# Patient Record
Sex: FEMALE | Race: WHITE | Employment: OTHER | URBAN - METROPOLITAN AREA
[De-identification: names, ages, dates, MRNs, and addresses within clinical notes are randomized per-mention and may not be internally consistent; named-entity substitution may affect disease eponyms.]

---

## 2022-03-24 ENCOUNTER — EMERGENCY VISIT (OUTPATIENT)
Dept: URBAN - METROPOLITAN AREA CLINIC 36 | Facility: CLINIC | Age: 67
End: 2022-03-24

## 2022-03-24 DIAGNOSIS — H04.123: ICD-10-CM

## 2022-03-24 DIAGNOSIS — H57.12: ICD-10-CM

## 2022-03-24 DIAGNOSIS — Z98.890: ICD-10-CM

## 2022-03-24 PROCEDURE — 92004 COMPRE OPH EXAM NEW PT 1/>: CPT

## 2022-03-24 ASSESSMENT — TONOMETRY
OS_IOP_MMHG: 11
OD_IOP_MMHG: 11

## 2022-03-24 ASSESSMENT — VISUAL ACUITY
OD_PH: 20/25
OS_SC: 20/30
OD_SC: 20/40-2

## 2022-03-29 ENCOUNTER — FOLLOW UP (OUTPATIENT)
Dept: URBAN - METROPOLITAN AREA CLINIC 36 | Facility: CLINIC | Age: 67
End: 2022-03-29

## 2022-03-29 DIAGNOSIS — H57.12: ICD-10-CM

## 2022-03-29 DIAGNOSIS — H04.123: ICD-10-CM

## 2022-03-29 PROCEDURE — 92012 INTRM OPH EXAM EST PATIENT: CPT

## 2022-04-06 NOTE — PATIENT DISCUSSION
Told her we would have to order trials and have her come in for an I&R and look at them on her eyes and have her try the trials before ordering.

## 2022-04-06 NOTE — PATIENT DISCUSSION
talked about cls today with pt - told her if we do cls she may still need to wear reading glasses over top of them for near vision.

## 2023-01-27 ENCOUNTER — COMPREHENSIVE EXAM (OUTPATIENT)
Dept: URBAN - METROPOLITAN AREA CLINIC 36 | Facility: CLINIC | Age: 68
End: 2023-01-27

## 2023-01-27 DIAGNOSIS — Z98.890: ICD-10-CM

## 2023-01-27 DIAGNOSIS — H52.7: ICD-10-CM

## 2023-01-27 DIAGNOSIS — H04.123: ICD-10-CM

## 2023-01-27 DIAGNOSIS — H25.813: ICD-10-CM

## 2023-01-27 DIAGNOSIS — H18.513: ICD-10-CM

## 2023-01-27 PROCEDURE — 92014 COMPRE OPH EXAM EST PT 1/>: CPT

## 2023-01-27 PROCEDURE — 92015 DETERMINE REFRACTIVE STATE: CPT

## 2023-01-27 ASSESSMENT — VISUAL ACUITY
OS_SC: 20/40-1
OD_CC: J1
OS_SC: J12
OS_CC: J1
OD_SC: J12
OD_SC: 20/30-1

## 2023-01-27 ASSESSMENT — TONOMETRY
OD_IOP_MMHG: 11
OS_IOP_MMHG: 11

## 2023-04-18 ENCOUNTER — POST-OP (OUTPATIENT)
Dept: URBAN - METROPOLITAN AREA CLINIC 36 | Facility: CLINIC | Age: 68
End: 2023-04-18

## 2023-04-18 DIAGNOSIS — Z96.1: ICD-10-CM

## 2023-04-18 PROCEDURE — 99024 POSTOP FOLLOW-UP VISIT: CPT

## 2023-04-18 ASSESSMENT — TONOMETRY
OS_IOP_MMHG: 10
OD_IOP_MMHG: 08

## 2023-04-18 ASSESSMENT — VISUAL ACUITY
OD_SC: J3
OU_SC: 20/20-2
OS_SC: 20/25-2
OS_SC: J3
OU_SC: J2
OD_SC: 20/30+2

## 2023-07-07 ENCOUNTER — FOLLOW UP (OUTPATIENT)
Dept: URBAN - METROPOLITAN AREA CLINIC 36 | Facility: CLINIC | Age: 68
End: 2023-07-07

## 2023-07-07 DIAGNOSIS — H35.721: ICD-10-CM

## 2023-07-07 DIAGNOSIS — H04.123: ICD-10-CM

## 2023-07-07 DIAGNOSIS — H53.8: ICD-10-CM

## 2023-07-07 PROCEDURE — 92012 INTRM OPH EXAM EST PATIENT: CPT

## 2023-07-07 PROCEDURE — 92134 CPTRZ OPH DX IMG PST SGM RTA: CPT

## 2023-07-07 RX ORDER — PREDNISOLONE ACETATE 10 MG/ML: 1 SUSPENSION/ DROPS OPHTHALMIC TWICE A DAY

## 2023-07-07 ASSESSMENT — TONOMETRY
OD_IOP_MMHG: 10
OS_IOP_MMHG: 11

## 2023-07-07 ASSESSMENT — VISUAL ACUITY
OS_SC: 20/20
OD_SC: 20/50-1
OD_PH: 20/30

## 2023-07-11 ENCOUNTER — FOLLOW UP (OUTPATIENT)
Dept: URBAN - METROPOLITAN AREA CLINIC 36 | Facility: CLINIC | Age: 68
End: 2023-07-11

## 2023-07-11 DIAGNOSIS — H35.721: ICD-10-CM

## 2023-07-11 DIAGNOSIS — H04.123: ICD-10-CM

## 2023-07-11 DIAGNOSIS — H53.8: ICD-10-CM

## 2023-07-11 PROCEDURE — 92134 CPTRZ OPH DX IMG PST SGM RTA: CPT

## 2023-07-11 PROCEDURE — 92012 INTRM OPH EXAM EST PATIENT: CPT

## 2023-07-11 ASSESSMENT — VISUAL ACUITY
OS_SC: 20/25
OD_SC: 20/40-1
OD_PH: 20/30-1

## 2023-07-11 ASSESSMENT — TONOMETRY
OD_IOP_MMHG: 10
OS_IOP_MMHG: 10

## 2023-10-17 ENCOUNTER — COMPREHENSIVE EXAM (OUTPATIENT)
Dept: URBAN - METROPOLITAN AREA CLINIC 36 | Facility: CLINIC | Age: 68
End: 2023-10-17

## 2023-10-17 DIAGNOSIS — H01.003: ICD-10-CM

## 2023-10-17 DIAGNOSIS — H18.593: ICD-10-CM

## 2023-10-17 DIAGNOSIS — H18.513: ICD-10-CM

## 2023-10-17 DIAGNOSIS — Z96.1: ICD-10-CM

## 2023-10-17 DIAGNOSIS — H43.813: ICD-10-CM

## 2023-10-17 DIAGNOSIS — H04.123: ICD-10-CM

## 2023-10-17 DIAGNOSIS — H52.7: ICD-10-CM

## 2023-10-17 DIAGNOSIS — H01.006: ICD-10-CM

## 2023-10-17 DIAGNOSIS — Z98.890: ICD-10-CM

## 2023-10-17 PROCEDURE — 92015 DETERMINE REFRACTIVE STATE: CPT

## 2023-10-17 PROCEDURE — 92014 COMPRE OPH EXAM EST PT 1/>: CPT

## 2023-10-17 ASSESSMENT — VISUAL ACUITY
OD_SC: 20/40+1
OS_SC: J3
OS_SC: 20/30-2
OD_SC: J3

## 2023-10-17 ASSESSMENT — TONOMETRY
OS_IOP_MMHG: 10
OD_IOP_MMHG: 08

## 2025-05-01 ENCOUNTER — COMPREHENSIVE EXAM (OUTPATIENT)
Age: 70
End: 2025-05-01

## 2025-05-01 DIAGNOSIS — H18.593: ICD-10-CM

## 2025-05-01 DIAGNOSIS — Z98.890: ICD-10-CM

## 2025-05-01 DIAGNOSIS — H04.123: ICD-10-CM

## 2025-05-01 DIAGNOSIS — H43.813: ICD-10-CM

## 2025-05-01 DIAGNOSIS — H01.006: ICD-10-CM

## 2025-05-01 DIAGNOSIS — H52.7: ICD-10-CM

## 2025-05-01 DIAGNOSIS — H18.513: ICD-10-CM

## 2025-05-01 DIAGNOSIS — H01.003: ICD-10-CM

## 2025-05-01 DIAGNOSIS — Z96.1: ICD-10-CM

## 2025-05-01 PROCEDURE — 92014 COMPRE OPH EXAM EST PT 1/>: CPT
